# Patient Record
Sex: MALE | Race: WHITE
[De-identification: names, ages, dates, MRNs, and addresses within clinical notes are randomized per-mention and may not be internally consistent; named-entity substitution may affect disease eponyms.]

---

## 2023-09-17 ENCOUNTER — HOSPITAL ENCOUNTER (EMERGENCY)
Dept: HOSPITAL 46 - ED | Age: 38
Discharge: HOME | End: 2023-09-17
Payer: MEDICAID

## 2023-09-17 VITALS — DIASTOLIC BLOOD PRESSURE: 84 MMHG | SYSTOLIC BLOOD PRESSURE: 120 MMHG

## 2023-09-17 VITALS — HEIGHT: 72 IN | BODY MASS INDEX: 24.96 KG/M2 | WEIGHT: 184.31 LBS

## 2023-09-17 DIAGNOSIS — F17.200: ICD-10-CM

## 2023-09-17 DIAGNOSIS — Z79.899: ICD-10-CM

## 2023-09-17 DIAGNOSIS — Z88.0: ICD-10-CM

## 2023-09-17 DIAGNOSIS — F11.93: Primary | ICD-10-CM

## 2023-09-17 NOTE — XMS
PreManage Notification: CARRIE HILL MRN:[id number]
 
Security Information
 
Security Events
No recent Security Events currently on file
 
 
 
CRITERIA MET
------------
- [facility] - 2 Visits in 30 Days
 
 
CARE PROVIDERS
-------------------------------------------------------------------------------------
[facility]: Multi-Specialty     07/31/2023-[facility] MEDICAL
 
PHONE: [phone]
-------------------------------------------------------------------------------------
JUNIOR DIOP      /Care Coordinator     Current
[facility] TEAM
 
PHONE: Unknown
-------------------------------------------------------------------------------------
 
David has no Care Guidelines for this patient.
 
VALDO VISIT COUNT (12 MO.)
-------------------------------------------------------------------------------------
2 [facility] Mace
 
[address] [place] [address]
-------------------------------------------------------------------------------------
TOTAL 3
-------------------------------------------------------------------------------------
NOTE: Visits indicate total known visits.
 
ED/UCC VISIT TRACKING (12 MO.)
-------------------------------------------------------------------------------------
09/17/2023 08:02
[facility] [place]
 
TYPE: Emergency
 
COMPLAINT:
- WITHDRAWL SYMTPOMS
-------------------------------------------------------------------------------------
09/01/2023 09:00
[facility]
 
TYPE: Emergency
 
DIAGNOSES:
- Encounter for removal of sutures
- Suture Removal
-------------------------------------------------------------------------------------
08/20/2023 02:26
 
[facility] [place]
 
TYPE: Emergency
 
DIAGNOSES:
- Laceration without foreign body of unspecified part of neck, initial encounter
- Unspecified fall, initial encounter
- Laceration
-------------------------------------------------------------------------------------
 
 
INPATIENT VISIT TRACKING (12 MO.)
No inpatient visits to display in this time frame
 
[email or web address]